# Patient Record
Sex: FEMALE | Race: WHITE | URBAN - METROPOLITAN AREA
[De-identification: names, ages, dates, MRNs, and addresses within clinical notes are randomized per-mention and may not be internally consistent; named-entity substitution may affect disease eponyms.]

---

## 2018-08-28 PROBLEM — I87.2 VENOUS INSUFFICIENCY: Status: ACTIVE | Noted: 2018-08-28

## 2018-08-28 NOTE — PROGRESS NOTES
Patient cancelled and given alternative vein provider names in area. I am currently not performing vein therapy as we are in the process of transitioning to a new device vendor.

## 2018-08-28 NOTE — PATIENT INSTRUCTIONS
Follow up with Sukhwinder Schuster at [facility] [phone]  [facility]  [address], [place] [place] [address] [place] [address]

## 2018-08-31 ENCOUNTER — OFFICE VISIT (OUTPATIENT)
Dept: CARDIOLOGY CLINIC | Age: 78
End: 2018-08-31

## 2018-08-31 VITALS
DIASTOLIC BLOOD PRESSURE: 74 MMHG | BODY MASS INDEX: 25.84 KG/M2 | SYSTOLIC BLOOD PRESSURE: 120 MMHG | HEIGHT: 60 IN | RESPIRATION RATE: 16 BRPM | WEIGHT: 131.6 LBS | OXYGEN SATURATION: 97 % | HEART RATE: 74 BPM

## 2018-08-31 DIAGNOSIS — I87.2 VENOUS INSUFFICIENCY: Primary | ICD-10-CM

## 2018-08-31 RX ORDER — FLUTICASONE PROPIONATE 50 MCG
SPRAY, SUSPENSION (ML) NASAL
COMMUNITY

## 2018-08-31 RX ORDER — CHLORAL HYDRATE 500 MG
CAPSULE ORAL
COMMUNITY

## 2018-08-31 RX ORDER — FEXOFENADINE HCL 180 MG/1
TABLET ORAL
COMMUNITY

## 2018-08-31 RX ORDER — LOSARTAN POTASSIUM 50 MG/1
TABLET ORAL
COMMUNITY
Start: 2018-08-13

## 2018-08-31 RX ORDER — LOSARTAN POTASSIUM 25 MG/1
TABLET ORAL
COMMUNITY

## 2018-08-31 RX ORDER — HYDROCHLOROTHIAZIDE 25 MG/1
TABLET ORAL
COMMUNITY
Start: 2018-06-27

## 2018-08-31 RX ORDER — CRANBERRY FRUIT EXTRACT 200 MG
CAPSULE ORAL
COMMUNITY
Start: 2017-02-13

## 2018-08-31 RX ORDER — AMLODIPINE BESYLATE 5 MG/1
TABLET ORAL
COMMUNITY

## 2023-07-13 ENCOUNTER — TELEPHONE (OUTPATIENT)
Dept: CARDIOLOGY CLINIC | Age: 83
End: 2023-07-13

## 2023-07-13 NOTE — TELEPHONE ENCOUNTER
New Patient Referral    Referring Provider Name:  [facility]   Phone Number: [phone]  Fax Number:  [phone]  Address: [address], 4th floor,[facility]    Diagnosis/Reason for Visit: r/o ischemic stroke - vasovagal syncope - r/o arrhythmia    Cardiac Clearance? no    Cardiac Testing: (Yes/No/Unsure)     Date testing was completed?___________      Have records been requested? (Yes/No)    Preferred Language: English    LM for pt to call and schedule. Please verify the old address in [facility] is her and change to correct address on [address].

## 2023-07-13 NOTE — TELEPHONE ENCOUNTER
Patient was referred by Dr. Adin Vargas  to the [facility] location with Dr. Ethan Mccrary.  Patient has been scheduled for 09/07 at 3:00pm (am/pm). Patient verbalized understanding of appointment instructions. Call complete.

## 2023-08-08 ENCOUNTER — NURSE ONLY (OUTPATIENT)
Dept: CARDIOLOGY CLINIC | Age: 83
End: 2023-08-08

## 2023-08-08 NOTE — PROGRESS NOTES
50 Hr E-Patch requested for pt. Device was registered and placed. Tutorial given, pt verbalized understanding.  Date 08/08/2023  Time 230pm S/N [id number]  xavier

## 2023-08-21 ENCOUNTER — HOSPITAL ENCOUNTER (OUTPATIENT)
Dept: MRI IMAGING | Age: 83
Discharge: HOME | End: 2023-08-21
Payer: MEDICARE

## 2023-08-21 ENCOUNTER — HOSPITAL ENCOUNTER (OUTPATIENT)
Dept: NEUROLOGY | Age: 83
Discharge: HOME | End: 2023-08-21
Payer: MEDICARE

## 2023-08-21 DIAGNOSIS — R55 VASOVAGAL SYNCOPE: ICD-10-CM

## 2023-08-21 DIAGNOSIS — I63.9 ISCHEMIC STROKE (HCC): ICD-10-CM

## 2023-08-21 DIAGNOSIS — E56.9 VITAMIN DEFICIENCY: ICD-10-CM

## 2023-08-21 DIAGNOSIS — I49.9 CARDIAC ARRHYTHMIA, UNSPECIFIED CARDIAC ARRHYTHMIA TYPE: ICD-10-CM

## 2023-08-21 PROCEDURE — 95816 EEG AWAKE AND DROWSY: CPT

## 2023-08-21 PROCEDURE — 95816 EEG AWAKE AND DROWSY: CPT | Performed by: PSYCHIATRY & NEUROLOGY

## 2023-08-21 PROCEDURE — 70551 MRI BRAIN STEM W/O DYE: CPT

## 2023-08-22 PROBLEM — I63.9 ISCHEMIC STROKE (HCC): Status: ACTIVE | Noted: 2023-08-22

## 2023-08-22 NOTE — PROCEDURES
[facility]                     [address], [address]                          ELECTROENCEPHALOGRAM REPORT    PATIENT NAME: Ayad Dutta                      :        1940  MED REC NO:   [id number]                          ROOM:  ACCOUNT NO:   [de-identified]                           ADMIT DATE: 2023  PROVIDER:     Lashaun Cash MD    DATE OF EE2023    REASON FOR STUDY:  Vasovagal syncope, ischemic stroke. SUMMARY:  The background rhythm on this recording consists of  moderately=developed and well-organized waveforms of 10-12 Hz frequency  which are bilaterally synchronous and symmetrical.  No focal  abnormalities were noted. No spikes or sharp waves were seen. Hyperventilation was not performed. Photic stimulation did not produce  any change. A sleep recording was not obtained. A recording during  drowsiness was normal.    IMPRESSION:  This EEG obtained during the awake state and drowsiness is  within normal limits.         Lashaun Cash MD    D: 2023 10:24:51       T: 2023 10:27:11     [email or web address]  Job#: [id number]     Doc#: [id number]    CC:

## 2023-09-07 ENCOUNTER — OFFICE VISIT (OUTPATIENT)
Dept: CARDIOLOGY CLINIC | Age: 83
End: 2023-09-07
Payer: MEDICARE

## 2023-09-07 VITALS
HEART RATE: 62 BPM | WEIGHT: 154.2 LBS | OXYGEN SATURATION: 99 % | DIASTOLIC BLOOD PRESSURE: 72 MMHG | BODY MASS INDEX: 30.27 KG/M2 | SYSTOLIC BLOOD PRESSURE: 128 MMHG | HEIGHT: 60 IN

## 2023-09-07 DIAGNOSIS — R55 SYNCOPE, UNSPECIFIED SYNCOPE TYPE: Primary | ICD-10-CM

## 2023-09-07 DIAGNOSIS — I47.1 SVT (SUPRAVENTRICULAR TACHYCARDIA) (HCC): ICD-10-CM

## 2023-09-07 PROBLEM — I47.10 SVT (SUPRAVENTRICULAR TACHYCARDIA): Status: ACTIVE | Noted: 2023-09-07

## 2023-09-07 PROCEDURE — 99204 OFFICE O/P NEW MOD 45 MIN: CPT | Performed by: INTERNAL MEDICINE

## 2023-09-07 PROCEDURE — 1123F ACP DISCUSS/DSCN MKR DOCD: CPT | Performed by: INTERNAL MEDICINE

## 2023-09-07 PROCEDURE — 93000 ELECTROCARDIOGRAM COMPLETE: CPT | Performed by: INTERNAL MEDICINE

## 2023-09-07 NOTE — PATIENT INSTRUCTIONS
No medication changes  Follow up in 6 months    If any symptoms of near passing out or passing out call office- may order a prolonged heart monitor

## 2023-09-07 NOTE — PROGRESS NOTES
tablet Take by mouth Yes Historical Provider, MD   fluticasone (FLONASE) 50 MCG/ACT nasal spray fluticasone 50 mcg/actuation nasal spray,suspension Yes Historical Provider, MD   hydrochlorothiazide (HYDRODIURIL) 25 MG tablet  Yes Historical Provider, MD   losartan (COZAAR) 50 MG tablet  Yes Historical Provider, MD   losartan (COZAAR) 25 MG tablet losartan 25 mg tablet Yes Historical Provider, MD   Red Yeast Rice Extract 600 MG CAPS One po bid Yes Historical Provider, MD   amLODIPine (NORVASC) 5 MG tablet amlodipine 5 mg tablet  Historical Provider, MD       [x] Medications and dosages reviewed. ROS:  [x]Full ROS obtained and negative except as mentioned in HPI      PHYSICAL EXAMINATION:  Vitals:    09/07/23 1449   BP: 128/72   Site: Left Upper Arm   Position: Sitting   Cuff Size: Medium Adult   Pulse: 62   SpO2: 99%   Weight: 154 lb 3.2 oz (69.9 kg)   Height: 5' (1.524 m)        GENERAL: Well developed, well nourished, No acute distress  NEUROLOGICAL: Alert and oriented  PSYCH: Calm affect  SKIN: Warm and dry, No visible rash,   EYES: Pupils equal and round, Sclera non-icteric,   HENT:  External ears and nose unremarkable, mucus membranes moist  MUSCULOSKELETAL: Normal cephalic, neck supple  CAROTID: Normal upstroke, no bruits  CARDIAC: JVP normal, Normal PMI, regular rate and rhythm, normal S1S2, no murmur, rub, or gallop  RESPIRATORY: Normal respiratory effort, clear to auscultation bilaterally  EXTREMITIES: No edema  GASTROINTESTINAL: normal bowel sounds, soft, non-tender, No hepatomegaly     3 Day Holter 8/2023  No afib, Sinus, Short PAT x 2, longest 6 beats    ECHO 4/2023  SUMMARY:     1. Left ventricle: The cavity size was normal. Wall thickness was      normal. Systolic function was normal. The estimated ejection      fraction was in the range of 55% to 60%. Wall motion was normal;      there were no regional wall motion abnormalities.  Doppler      parameters are consistent with abnormal left ventricular

## 2024-01-04 ENCOUNTER — TELEPHONE (OUTPATIENT)
Dept: CARDIOLOGY CLINIC | Age: 84
End: 2024-01-04

## 2024-01-04 ENCOUNTER — APPOINTMENT (OUTPATIENT)
Dept: GENERAL RADIOLOGY | Age: 84
End: 2024-01-04
Payer: MEDICARE

## 2024-01-04 ENCOUNTER — HOSPITAL ENCOUNTER (EMERGENCY)
Age: 84
Discharge: HOME | End: 2024-01-04
Attending: INTERNAL MEDICINE
Payer: MEDICARE

## 2024-01-04 ENCOUNTER — APPOINTMENT (OUTPATIENT)
Dept: CT IMAGING | Age: 84
End: 2024-01-04
Payer: MEDICARE

## 2024-01-04 VITALS
DIASTOLIC BLOOD PRESSURE: 80 MMHG | WEIGHT: 155 LBS | HEART RATE: 67 BPM | HEIGHT: 60 IN | RESPIRATION RATE: 18 BRPM | TEMPERATURE: 97.8 F | SYSTOLIC BLOOD PRESSURE: 182 MMHG | BODY MASS INDEX: 30.43 KG/M2

## 2024-01-04 DIAGNOSIS — R06.02 SHORTNESS OF BREATH: Primary | ICD-10-CM

## 2024-01-04 LAB
ANION GAP SERPL CALCULATED.3IONS-SCNC: 11 MMOL/L (ref 3–16)
BASOPHILS # BLD: 0 K/UL (ref 0–0.2)
BASOPHILS NFR BLD: 0.2 %
BUN SERPL-MCNC: 18 MG/DL (ref 7–20)
CALCIUM SERPL-MCNC: 10 MG/DL (ref 8.3–10.6)
CHLORIDE SERPL-SCNC: 94 MMOL/L (ref 99–110)
CO2 SERPL-SCNC: 29 MMOL/L (ref 21–32)
CREAT SERPL-MCNC: 0.8 MG/DL (ref 0.6–1.2)
D DIMER: 2.45 UG/ML FEU (ref 0–0.6)
DEPRECATED RDW RBC AUTO: 14.1 % (ref 12.4–15.4)
EKG ATRIAL RATE: 70 BPM
EKG DIAGNOSIS: NORMAL
EKG P AXIS: 55 DEGREES
EKG P-R INTERVAL: 194 MS
EKG Q-T INTERVAL: 392 MS
EKG QRS DURATION: 90 MS
EKG QTC CALCULATION (BAZETT): 423 MS
EKG R AXIS: -13 DEGREES
EKG T AXIS: 48 DEGREES
EKG VENTRICULAR RATE: 70 BPM
EOSINOPHIL # BLD: 0 K/UL (ref 0–0.6)
EOSINOPHIL NFR BLD: 0 %
FLUAV RNA RESP QL NAA+PROBE: NOT DETECTED
FLUBV RNA RESP QL NAA+PROBE: NOT DETECTED
GFR SERPLBLD CREATININE-BSD FMLA CKD-EPI: >60 ML/MIN/{1.73_M2}
GLUCOSE SERPL-MCNC: 96 MG/DL (ref 70–99)
HCT VFR BLD AUTO: 38.2 % (ref 36–48)
HGB BLD-MCNC: 13.3 G/DL (ref 12–16)
LYMPHOCYTES # BLD: 0.5 K/UL (ref 1–5.1)
LYMPHOCYTES NFR BLD: 6 %
MCH RBC QN AUTO: 33 PG (ref 26–34)
MCHC RBC AUTO-ENTMCNC: 34.7 G/DL (ref 31–36)
MCV RBC AUTO: 94.9 FL (ref 80–100)
MONOCYTES # BLD: 0.4 K/UL (ref 0–1.3)
MONOCYTES NFR BLD: 4.1 %
NEUTROPHILS # BLD: 8.2 K/UL (ref 1.7–7.7)
NEUTROPHILS NFR BLD: 89.7 %
NT-PROBNP SERPL-MCNC: 283 PG/ML (ref 0–449)
PLATELET # BLD AUTO: 219 K/UL (ref 135–450)
PMV BLD AUTO: 6.9 FL (ref 5–10.5)
POTASSIUM SERPL-SCNC: 3.5 MMOL/L (ref 3.5–5.1)
RBC # BLD AUTO: 4.03 M/UL (ref 4–5.2)
RSV AG NOSE QL: NEGATIVE
SARS-COV-2 RNA RESP QL NAA+PROBE: NOT DETECTED
SODIUM SERPL-SCNC: 134 MMOL/L (ref 136–145)
TROPONIN, HIGH SENSITIVITY: 13 NG/L (ref 0–14)
WBC # BLD AUTO: 9.2 K/UL (ref 4–11)

## 2024-01-04 PROCEDURE — 71045 X-RAY EXAM CHEST 1 VIEW: CPT

## 2024-01-04 PROCEDURE — 85025 COMPLETE CBC W/AUTO DIFF WBC: CPT

## 2024-01-04 PROCEDURE — 71260 CT THORAX DX C+: CPT

## 2024-01-04 PROCEDURE — 99285 EMERGENCY DEPT VISIT HI MDM: CPT

## 2024-01-04 PROCEDURE — 80048 BASIC METABOLIC PNL TOTAL CA: CPT

## 2024-01-04 PROCEDURE — 6360000004 HC RX CONTRAST MEDICATION: Performed by: INTERNAL MEDICINE

## 2024-01-04 PROCEDURE — 93005 ELECTROCARDIOGRAM TRACING: CPT | Performed by: INTERNAL MEDICINE

## 2024-01-04 PROCEDURE — 83880 ASSAY OF NATRIURETIC PEPTIDE: CPT

## 2024-01-04 PROCEDURE — 85379 FIBRIN DEGRADATION QUANT: CPT

## 2024-01-04 PROCEDURE — 87807 RSV ASSAY W/OPTIC: CPT

## 2024-01-04 PROCEDURE — 36415 COLL VENOUS BLD VENIPUNCTURE: CPT

## 2024-01-04 PROCEDURE — 87636 SARSCOV2 & INF A&B AMP PRB: CPT

## 2024-01-04 PROCEDURE — 93010 ELECTROCARDIOGRAM REPORT: CPT | Performed by: INTERNAL MEDICINE

## 2024-01-04 PROCEDURE — 84484 ASSAY OF TROPONIN QUANT: CPT

## 2024-01-04 RX ORDER — MULTIVIT WITH MINERALS/LUTEIN
250 TABLET ORAL DAILY
COMMUNITY

## 2024-01-04 RX ADMIN — IOPAMIDOL 75 ML: 755 INJECTION, SOLUTION INTRAVENOUS at 18:08

## 2024-01-04 ASSESSMENT — LIFESTYLE VARIABLES
HOW OFTEN DO YOU HAVE A DRINK CONTAINING ALCOHOL: NEVER
HOW MANY STANDARD DRINKS CONTAINING ALCOHOL DO YOU HAVE ON A TYPICAL DAY: PATIENT DOES NOT DRINK

## 2024-01-04 ASSESSMENT — PAIN - FUNCTIONAL ASSESSMENT: PAIN_FUNCTIONAL_ASSESSMENT: NONE - DENIES PAIN

## 2024-01-04 NOTE — TELEPHONE ENCOUNTER
Per [facility]- She needs a stress myoview but may not be able to get soon enough due to insurance. She should go to ER for evaluation.

## 2024-01-04 NOTE — ED NOTES
Patient ambulated from [facility] to Zone 3 bathroom, past room 9, and back to [facility].    SPO2 at rest: 100%  Pulse at rest: 76 bpm    SPO2 while ambulating 100%  Pulse while ambulatin bpm

## 2024-01-04 NOTE — ED PROVIDER NOTES
EMERGENCY MEDICINE PROVIDER NOTE    Patient Identification  Pt Name: Jany Morfin  MRN: [id number]  Birthdate 1940  Date of evaluation: 1/4/2024  Provider: KRISTI VERDE DO  PCP: Lionel Perdomo    Chief Complaint  Shortness of Breath (Patient states that her doctor sent her here for SOB worse with exertion sometimes at rest. She had some pulmonary tests done and they were fine. )      HPI  (History provided by patient)  This is a 83 y.o. female who was brought in by self for short of breath that has been going on for a while.  Bring to the patient she has been seen by her primary care physician but was sent into the ER due to persistent shortness of breath with exertion.  At times, she also has some shortness of breath with rest.  Patient denies cough.  Patient denies fever and chills.  Patient denies any peripheral edema.  She was tried on some steroids for shortness of breath but she tells me it did not help.  Patient states that she has had some pulmonary test done and they were found to be normal.    I have reviewed the following nursing documentation:  Allergies: Aspirin, Cetirizine, Gemfibrozil, Levofloxacin, Loratadine, and Lovastatin    Past medical history:   Past Medical History:   Diagnosis Date    Hypertension     Syncope and collapse      Past surgical history:   Past Surgical History:   Procedure Laterality Date    CHOLECYSTECTOMY, LAPAROSCOPIC      HYSTERECTOMY, TOTAL ABDOMINAL (CERVIX REMOVED)         Home medications:   Discharge Medication List as of 1/4/2024  8:02 PM        CONTINUE these medications which have NOT CHANGED    Details   Ascorbic Acid (VITAMIN C) 250 MG tablet Take 1 tablet by mouth dailyHistorical Med      amLODIPine (NORVASC) 5 MG tablet amlodipine 5 mg tabletHistorical Med      Cholecalciferol 2000 units TABS Take by mouthHistorical Med      Omega-3 Fatty Acids (FISH OIL) 1000 MG CAPS Fish Oil   dailyHistorical Med      fexofenadine (ALLEGRA) 180 MG tablet Take by

## 2024-01-04 NOTE — TELEPHONE ENCOUNTER
Spoke to patient. She is having SOB at rest that gets worse with activity that started about 3 weeks ago. Her SOB has gotten worse over the last week. When her SOB gets real bad she develops chest heaviness. Unable to detail chest heaviness. Both are alleviated after rest. She has not had any heart racing.     She sounds SOB just talking on the phone and with little activity. Does not feel lke she needs to go to ER.    She saw PCP. EKG done (can't view) Chest x ray normal, CTA chest normal. BMP 11/21 normal but done before symptoms. Has not had other labs besides D Dimer. She was started on Prednisone and given inhaler by PCP but had to stop inhaler  due to mouth sores.    She saw Dr Nichole (pulm) today and he told her it was not her lungs. He thinks she may have a heart blockage.

## 2024-01-04 NOTE — TELEPHONE ENCOUNTER
Pt states she has increasing SOB with out activity over the last 3 weeks. Can hear it in her voice while talking to her. Denies chest pain increased BP or swelling. Please call to advise

## 2024-01-08 ENCOUNTER — OFFICE VISIT (OUTPATIENT)
Dept: CARDIOLOGY CLINIC | Age: 84
End: 2024-01-08
Payer: MEDICARE

## 2024-01-08 VITALS
BODY MASS INDEX: 30.23 KG/M2 | HEIGHT: 60 IN | OXYGEN SATURATION: 99 % | DIASTOLIC BLOOD PRESSURE: 70 MMHG | WEIGHT: 154 LBS | HEART RATE: 68 BPM | SYSTOLIC BLOOD PRESSURE: 132 MMHG

## 2024-01-08 DIAGNOSIS — I47.10 SVT (SUPRAVENTRICULAR TACHYCARDIA): ICD-10-CM

## 2024-01-08 DIAGNOSIS — R07.89 OTHER CHEST PAIN: ICD-10-CM

## 2024-01-08 DIAGNOSIS — R06.02 SOB (SHORTNESS OF BREATH): Primary | ICD-10-CM

## 2024-01-08 DIAGNOSIS — I10 PRIMARY HYPERTENSION: ICD-10-CM

## 2024-01-08 PROCEDURE — G8417 CALC BMI ABV UP PARAM F/U: HCPCS | Performed by: NURSE PRACTITIONER

## 2024-01-08 PROCEDURE — 1090F PRES/ABSN URINE INCON ASSESS: CPT | Performed by: NURSE PRACTITIONER

## 2024-01-08 PROCEDURE — 3078F DIAST BP <80 MM HG: CPT | Performed by: NURSE PRACTITIONER

## 2024-01-08 PROCEDURE — 3075F SYST BP GE 130 - 139MM HG: CPT | Performed by: NURSE PRACTITIONER

## 2024-01-08 PROCEDURE — G8427 DOCREV CUR MEDS BY ELIG CLIN: HCPCS | Performed by: NURSE PRACTITIONER

## 2024-01-08 PROCEDURE — G8484 FLU IMMUNIZE NO ADMIN: HCPCS | Performed by: NURSE PRACTITIONER

## 2024-01-08 PROCEDURE — 1036F TOBACCO NON-USER: CPT | Performed by: NURSE PRACTITIONER

## 2024-01-08 PROCEDURE — 1123F ACP DISCUSS/DSCN MKR DOCD: CPT | Performed by: NURSE PRACTITIONER

## 2024-01-08 PROCEDURE — G8400 PT W/DXA NO RESULTS DOC: HCPCS | Performed by: NURSE PRACTITIONER

## 2024-01-08 PROCEDURE — 99214 OFFICE O/P EST MOD 30 MIN: CPT | Performed by: NURSE PRACTITIONER

## 2024-01-08 RX ORDER — ISOSORBIDE MONONITRATE 30 MG/1
15 TABLET, EXTENDED RELEASE ORAL DAILY
Qty: 30 TABLET | Refills: 3 | Status: SHIPPED | OUTPATIENT
Start: 2024-01-08

## 2024-01-08 NOTE — PROGRESS NOTES
[facility]     Outpatient Follow Up Note    Jany Morfin is 83 y.o. female who presents today with a history of syncope suspected dehydration vs vagal, SVT/PAT and HTN.      Interval hx: 1/4/24: ER:  83 y.o. female who was brought in by self for short of breath that has been going on for a while.  Bring to the patient she has been seen by her primary care physician but was sent into the ER due to persistent shortness of breath with exertion.  At times, she also has some shortness of breath with rest.  Patient denies cough.  Patient denies fever and chills.  Patient denies any peripheral edema.  She was tried on some steroids for shortness of breath but she tells me it did not help.  Patient states that she has had some pulmonary test done and they were found to be normal.     EKG did not show any acute findings.  Troponin was not found to be elevated.  D-dimer was elevated at 2.45 and a CT of the chest was done which did not show evidence of a PE.  Patient has not hypoxic or tachycardic.  Patient is not hemodynamically unstable.  I do not see any evidence of pneumonia or infection.  Patient states she follows with a pulmonologist and also Dr. Morris with cardiology     CHIEF COMPLAINT / HPI:  Follow Up secondary to SOB    Subjective:   She has a little chest heaviness with exertion. She has no associated radiation/nausea.    She's been SOB for over 3 weeks. She went downstairs one Saturday and her son noticed she was SOB. Four days ago, it'd really gotten worse. She saw her pulmonologist who ruled out being from her lungs  She's ok if she walks slow. It seems to be the worse in the morning. The patient denies orthopnea/PND. She's tired as well.    The patient has swelling from varicose veins. The patients weight is stable . The patient is not experiencing palpitations or dizziness.   Her BP is low - normal in the mornings and normal - high by evening.     These symptoms are worsening since the last OV.

## 2024-01-08 NOTE — PATIENT INSTRUCTIONS
CTA cardiac : looks at your heart's blood supply non-invasively    Begin isosorbide mononitrate : 30 mg tablets at 1/2 tablet daily     Appt in 1 week after CTA

## 2024-01-09 ENCOUNTER — HOSPITAL ENCOUNTER (OUTPATIENT)
Dept: CT IMAGING | Age: 84
Discharge: HOME | End: 2024-01-09
Payer: MEDICARE

## 2024-01-09 VITALS
SYSTOLIC BLOOD PRESSURE: 124 MMHG | DIASTOLIC BLOOD PRESSURE: 79 MMHG | RESPIRATION RATE: 16 BRPM | OXYGEN SATURATION: 100 % | HEART RATE: 63 BPM

## 2024-01-09 DIAGNOSIS — R07.89 OTHER CHEST PAIN: ICD-10-CM

## 2024-01-09 DIAGNOSIS — R06.02 SOB (SHORTNESS OF BREATH): ICD-10-CM

## 2024-01-09 PROCEDURE — 6360000004 HC RX CONTRAST MEDICATION: Performed by: NURSE PRACTITIONER

## 2024-01-09 PROCEDURE — 75574 CT ANGIO HRT W/3D IMAGE: CPT

## 2024-01-09 RX ORDER — LOSARTAN POTASSIUM 100 MG/1
TABLET ORAL
COMMUNITY
Start: 2023-12-01

## 2024-01-09 RX ORDER — BUDESONIDE, GLYCOPYRROLATE, AND FORMOTEROL FUMARATE 160; 9; 4.8 UG/1; UG/1; UG/1
2 AEROSOL, METERED RESPIRATORY (INHALATION) 2 TIMES DAILY
COMMUNITY
Start: 2023-12-29

## 2024-01-09 RX ORDER — ALBUTEROL SULFATE 90 UG/1
2 AEROSOL, METERED RESPIRATORY (INHALATION) EVERY 6 HOURS PRN
COMMUNITY
Start: 2023-12-29

## 2024-01-09 RX ADMIN — IOPAMIDOL 100 ML: 755 INJECTION, SOLUTION INTRAVENOUS at 08:54

## 2024-01-09 NOTE — DISCHARGE INSTRUCTIONS
Coronary Computed Tomography Angiography (CCTA)    Coronary Computed Tomography Angiography (CCTA) is a noninvasive test that uses X-rays in the form of computed tomography to diagnose coronary artery disease. Coronary CTA provides high-resolution images of the blood vessels supplying the heart, allowing identification of narrowing or blockages caused by plaque.      What Are the Benefits of Coronary CTA?    Provides high-definition 3-D images of the arteries feeding the heart and blockages at the earliest stages when they can be treated most effectively.  Most accurate noninvasive diagnostic test for coronary artery disease.  Measures both calcified and noncalcified plaques. Noncalcified plaques are more prone to rupture and cause heart attacks than calcified plaques. By assessing both types of plaque, coronary CTA analyzes your risk for a heart attack.  Monitor the effectiveness of therapy since noncalcified plaques may shrink with effective treatment.    This care sheet gives you a general idea about how long it will take for you to recover. But each person recovers at a different pace. Follow the steps below to feel better as quickly as possible.  How can you care for yourself at home?  Activity    If you experience a headache it is from the Nitroglycerin. Have a drink with caffeine to alleviate the symptoms.  Should symptoms continue contact your physician.        Drink plenty of water     You can resume normal activity     If your doctor recommends it, get more exercise. Walking is a good choice. Bit by bit, increase the amount you walk every day. Try for at least 30 minutes on most days of the week.   Diet    Drink plenty of fluids to help your body flush out the dye. If you have kidney, heart, or liver disease and have to limit fluids, talk with your doctor before you increase the amount of fluids you drink.     Keep eating a heart-healthy diet that has lots of fruits, vegetables, and whole grains. If you

## 2024-01-15 ENCOUNTER — TELEPHONE (OUTPATIENT)
Dept: CARDIOLOGY CLINIC | Age: 84
End: 2024-01-15

## 2024-01-15 NOTE — TELEPHONE ENCOUNTER
----- Message from LORRI Kinney CNP sent at 1/12/2024  3:30 PM EST -----  Calcium score 4     Pul nodules noted >> will need surveillance scans in one year and follow up with PCP

## 2024-01-16 ENCOUNTER — OFFICE VISIT (OUTPATIENT)
Dept: CARDIOLOGY CLINIC | Age: 84
End: 2024-01-16
Payer: MEDICARE

## 2024-01-16 VITALS
OXYGEN SATURATION: 99 % | BODY MASS INDEX: 30.63 KG/M2 | HEIGHT: 60 IN | WEIGHT: 156 LBS | SYSTOLIC BLOOD PRESSURE: 118 MMHG | DIASTOLIC BLOOD PRESSURE: 60 MMHG | HEART RATE: 73 BPM

## 2024-01-16 DIAGNOSIS — I47.10 SVT (SUPRAVENTRICULAR TACHYCARDIA): ICD-10-CM

## 2024-01-16 DIAGNOSIS — I10 PRIMARY HYPERTENSION: ICD-10-CM

## 2024-01-16 DIAGNOSIS — R07.89 OTHER CHEST PAIN: ICD-10-CM

## 2024-01-16 DIAGNOSIS — R06.02 SOB (SHORTNESS OF BREATH): Primary | ICD-10-CM

## 2024-01-16 PROCEDURE — G8427 DOCREV CUR MEDS BY ELIG CLIN: HCPCS | Performed by: NURSE PRACTITIONER

## 2024-01-16 PROCEDURE — 1123F ACP DISCUSS/DSCN MKR DOCD: CPT | Performed by: NURSE PRACTITIONER

## 2024-01-16 PROCEDURE — 3074F SYST BP LT 130 MM HG: CPT | Performed by: NURSE PRACTITIONER

## 2024-01-16 PROCEDURE — 1090F PRES/ABSN URINE INCON ASSESS: CPT | Performed by: NURSE PRACTITIONER

## 2024-01-16 PROCEDURE — G8484 FLU IMMUNIZE NO ADMIN: HCPCS | Performed by: NURSE PRACTITIONER

## 2024-01-16 PROCEDURE — G8400 PT W/DXA NO RESULTS DOC: HCPCS | Performed by: NURSE PRACTITIONER

## 2024-01-16 PROCEDURE — 99214 OFFICE O/P EST MOD 30 MIN: CPT | Performed by: NURSE PRACTITIONER

## 2024-01-16 PROCEDURE — G8417 CALC BMI ABV UP PARAM F/U: HCPCS | Performed by: NURSE PRACTITIONER

## 2024-01-16 PROCEDURE — 1036F TOBACCO NON-USER: CPT | Performed by: NURSE PRACTITIONER

## 2024-01-16 PROCEDURE — 3078F DIAST BP <80 MM HG: CPT | Performed by: NURSE PRACTITIONER

## 2024-01-16 NOTE — PATIENT INSTRUCTIONS
Echocardiogram : reassess your heart's squeeze and valves    Stress test: assess BP and oxygen saturation change    Follow up with pulmonologist : paralyzed hemidiaphragm Rt    Appt in 2-3 weeks

## 2024-01-16 NOTE — PROGRESS NOTES
[facility]     Outpatient Follow Up Note    Jany Morfin is 83 y.o. female who presents today with a history of syncope suspected dehydration vs vagal, SVT/PAT and HTN.      Interval hx: 1/4/24: ER:  83 y.o. female who was brought in by self for short of breath that has been going on for a while.  Bring to the patient she has been seen by her primary care physician but was sent into the ER due to persistent shortness of breath with exertion.  At times, she also has some shortness of breath with rest.  Patient denies cough.  Patient denies fever and chills.  Patient denies any peripheral edema.  She was tried on some steroids for shortness of breath but she tells me it did not help.  Patient states that she has had some pulmonary test done and they were found to be normal.     EKG did not show any acute findings.  Troponin was not found to be elevated.  D-dimer was elevated at 2.45 and a CT of the chest was done which did not show evidence of a PE.  Patient has not hypoxic or tachycardic.  Patient is not hemodynamically unstable.  I do not see any evidence of pneumonia or infection.  Patient states she follows with a pulmonologist and also Dr. Morris with cardiology     CHIEF COMPLAINT / HPI:  Follow Up secondary to SOB. Her CTA cardiac returned with a score of 4    Subjective:   Imdur helped some. She's had days that are a little better than others. She remains SOB going on 4 weeks    She breaths ok if she walks slowly. It seems to be the worse in the morning. The patient denies orthopnea/PND.     She has a little chest heaviness with exertion and at times discomfort beneath her breast. She has no associated radiation/nausea.  The patient has swelling from varicose veins. The patients weight is stable . The patient is not experiencing palpitations or dizziness.   Her BP is low - normal in the mornings and normal - high by evening.     These symptoms are essentially unchanged since the last OV.   With

## 2024-01-19 ENCOUNTER — HOSPITAL ENCOUNTER (OUTPATIENT)
Dept: CARDIOLOGY | Age: 84
Discharge: HOME | End: 2024-01-19
Payer: MEDICARE

## 2024-01-19 DIAGNOSIS — R06.02 SOB (SHORTNESS OF BREATH): ICD-10-CM

## 2024-01-19 DIAGNOSIS — R07.89 OTHER CHEST PAIN: ICD-10-CM

## 2024-01-19 PROCEDURE — 93306 TTE W/DOPPLER COMPLETE: CPT

## 2024-01-22 ENCOUNTER — TELEPHONE (OUTPATIENT)
Dept: CARDIOLOGY CLINIC | Age: 84
End: 2024-01-22

## 2024-01-29 ENCOUNTER — TELEPHONE (OUTPATIENT)
Dept: CARDIOLOGY CLINIC | Age: 84
End: 2024-01-29

## 2024-01-29 ENCOUNTER — PROCEDURE VISIT (OUTPATIENT)
Dept: CARDIOLOGY CLINIC | Age: 84
End: 2024-01-29
Payer: MEDICARE

## 2024-01-29 DIAGNOSIS — R06.02 SOB (SHORTNESS OF BREATH): ICD-10-CM

## 2024-01-29 PROCEDURE — 93015 CV STRESS TEST SUPVJ I&R: CPT | Performed by: INTERNAL MEDICINE

## 2024-01-29 NOTE — TELEPHONE ENCOUNTER
CARDIAC STRESS TEST EXERCISE ONLY ----- Message from LORRI Kinney CNP sent at 1/29/2024  1:52 PM EST -----  ok

## 2024-01-30 ENCOUNTER — OFFICE VISIT (OUTPATIENT)
Dept: CARDIOLOGY CLINIC | Age: 84
End: 2024-01-30
Payer: MEDICARE

## 2024-01-30 VITALS
WEIGHT: 155 LBS | HEIGHT: 60 IN | BODY MASS INDEX: 30.43 KG/M2 | SYSTOLIC BLOOD PRESSURE: 150 MMHG | DIASTOLIC BLOOD PRESSURE: 60 MMHG | HEART RATE: 75 BPM | OXYGEN SATURATION: 99 %

## 2024-01-30 DIAGNOSIS — I10 PRIMARY HYPERTENSION: ICD-10-CM

## 2024-01-30 DIAGNOSIS — I47.10 SVT (SUPRAVENTRICULAR TACHYCARDIA): ICD-10-CM

## 2024-01-30 DIAGNOSIS — R07.89 OTHER CHEST PAIN: ICD-10-CM

## 2024-01-30 DIAGNOSIS — R06.02 SOB (SHORTNESS OF BREATH): Primary | ICD-10-CM

## 2024-01-30 PROCEDURE — G8400 PT W/DXA NO RESULTS DOC: HCPCS | Performed by: NURSE PRACTITIONER

## 2024-01-30 PROCEDURE — G8484 FLU IMMUNIZE NO ADMIN: HCPCS | Performed by: NURSE PRACTITIONER

## 2024-01-30 PROCEDURE — 3078F DIAST BP <80 MM HG: CPT | Performed by: NURSE PRACTITIONER

## 2024-01-30 PROCEDURE — G8417 CALC BMI ABV UP PARAM F/U: HCPCS | Performed by: NURSE PRACTITIONER

## 2024-01-30 PROCEDURE — 1123F ACP DISCUSS/DSCN MKR DOCD: CPT | Performed by: NURSE PRACTITIONER

## 2024-01-30 PROCEDURE — 3077F SYST BP >= 140 MM HG: CPT | Performed by: NURSE PRACTITIONER

## 2024-01-30 PROCEDURE — 99214 OFFICE O/P EST MOD 30 MIN: CPT | Performed by: NURSE PRACTITIONER

## 2024-01-30 PROCEDURE — G8428 CUR MEDS NOT DOCUMENT: HCPCS | Performed by: NURSE PRACTITIONER

## 2024-01-30 PROCEDURE — 1036F TOBACCO NON-USER: CPT | Performed by: NURSE PRACTITIONER

## 2024-01-30 PROCEDURE — 1090F PRES/ABSN URINE INCON ASSESS: CPT | Performed by: NURSE PRACTITIONER

## 2024-01-30 RX ORDER — VALSARTAN 160 MG/1
160 TABLET ORAL DAILY
Qty: 90 TABLET | Refills: 1 | Status: SHIPPED | OUTPATIENT
Start: 2024-01-30

## 2024-01-30 RX ORDER — ISOSORBIDE MONONITRATE 30 MG/1
30 TABLET, EXTENDED RELEASE ORAL DAILY
Qty: 30 TABLET | Refills: 5 | Status: SHIPPED | OUTPATIENT
Start: 2024-01-30

## 2024-01-30 NOTE — PROGRESS NOTES
[facility]     Outpatient Follow Up Note    Jany Morfin is 83 y.o. female who presents today with a history of syncope suspected dehydration vs vagal, SVT/PAT and HTN.      hx: 1/4/24: ER:  83 y.o. female who was brought in by self for short of breath that has been going on for a while.  Bring to the patient she has been seen by her primary care physician but was sent into the ER due to persistent shortness of breath with exertion.  At times, she also has some shortness of breath with rest.  Patient denies cough.  Patient denies fever and chills.  Patient denies any peripheral edema.  She was tried on some steroids for shortness of breath but she tells me it did not help.  Patient states that she has had some pulmonary test done and they were found to be normal.     EKG did not show any acute findings.  Troponin was not found to be elevated.  D-dimer was elevated at 2.45 and a CT of the chest was done which did not show evidence of a PE.  Patient has not hypoxic or tachycardic.  Patient is not hemodynamically unstable.  I do not see any evidence of pneumonia or infection.  Patient states she follows with a pulmonologist and also Dr. Morris with cardiology     Interval hx:   Echo: unremarkable, EF 60%  GXT:  ~stress induced shortness of breath with resting O2 at 99% RA and  peak O2 97% RA.   ~Stress Peak BP: 165/71 mmHg       CHIEF COMPLAINT / HPI:  Follow Up secondary to SOB.     Subjective:   Isosorbide has helped immensely. 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 She was ironing this morning then after 5-6 garments she started to feel winded.  She still has some bad days but compared to where she was, its great. She went to [employer] after [employer], walked ~ 1/2 block to the restaurant then was a little winded.    She denies orthopnea/PND. Her breathing doesn't bother her at nighttime    She breaths ok if she walks slowly.   She has some chest discomfort that seems to change directions, ie in

## 2024-01-30 NOTE — PATIENT INSTRUCTIONS
Increase isosorbide to 1 tablet = 30 mg daily    Stop losartan and begin valsartan 160 mg daily    Keep appt with Dr. Morris

## 2024-02-05 ENCOUNTER — TELEPHONE (OUTPATIENT)
Dept: CARDIOLOGY CLINIC | Age: 84
End: 2024-02-05

## 2024-02-05 NOTE — TELEPHONE ENCOUNTER
The patient phoned stating that [facility] is saying that her   isosorbide mononitrate (IMDUR) 30 MG extended release tablet prescription cannot be refilled unless [facility]PT[facility] calls the Insurance because the dosage has been changed.  [employer] [phone].  Thank you

## 2024-02-06 RX ORDER — ISOSORBIDE MONONITRATE 30 MG/1
30 TABLET, EXTENDED RELEASE ORAL DAILY
Qty: 30 TABLET | Refills: 5 | Status: SHIPPED | OUTPATIENT
Start: 2024-02-06

## 2024-02-06 NOTE — TELEPHONE ENCOUNTER
I called the pharmacy ; she is authorized to  30 mg of isosorbide mononitrate daily; to make sure, I sent a new prescription over to [facility] pharmacy    Will you let her know please.

## 2024-03-07 ENCOUNTER — OFFICE VISIT (OUTPATIENT)
Dept: CARDIOLOGY CLINIC | Age: 84
End: 2024-03-07
Payer: MEDICARE

## 2024-03-07 VITALS
OXYGEN SATURATION: 99 % | WEIGHT: 151 LBS | SYSTOLIC BLOOD PRESSURE: 134 MMHG | BODY MASS INDEX: 29.64 KG/M2 | DIASTOLIC BLOOD PRESSURE: 70 MMHG | HEART RATE: 66 BPM | HEIGHT: 60 IN

## 2024-03-07 DIAGNOSIS — I47.10 SVT (SUPRAVENTRICULAR TACHYCARDIA): ICD-10-CM

## 2024-03-07 DIAGNOSIS — E78.2 MIXED HYPERLIPIDEMIA: ICD-10-CM

## 2024-03-07 DIAGNOSIS — R55 SYNCOPE, UNSPECIFIED SYNCOPE TYPE: ICD-10-CM

## 2024-03-07 DIAGNOSIS — R06.02 SOB (SHORTNESS OF BREATH): Primary | ICD-10-CM

## 2024-03-07 DIAGNOSIS — I10 PRIMARY HYPERTENSION: ICD-10-CM

## 2024-03-07 PROCEDURE — G8427 DOCREV CUR MEDS BY ELIG CLIN: HCPCS | Performed by: INTERNAL MEDICINE

## 2024-03-07 PROCEDURE — 99214 OFFICE O/P EST MOD 30 MIN: CPT | Performed by: INTERNAL MEDICINE

## 2024-03-07 PROCEDURE — 1123F ACP DISCUSS/DSCN MKR DOCD: CPT | Performed by: INTERNAL MEDICINE

## 2024-03-07 PROCEDURE — G8417 CALC BMI ABV UP PARAM F/U: HCPCS | Performed by: INTERNAL MEDICINE

## 2024-03-07 PROCEDURE — G8484 FLU IMMUNIZE NO ADMIN: HCPCS | Performed by: INTERNAL MEDICINE

## 2024-03-07 PROCEDURE — G8400 PT W/DXA NO RESULTS DOC: HCPCS | Performed by: INTERNAL MEDICINE

## 2024-03-07 PROCEDURE — 3078F DIAST BP <80 MM HG: CPT | Performed by: INTERNAL MEDICINE

## 2024-03-07 PROCEDURE — 1090F PRES/ABSN URINE INCON ASSESS: CPT | Performed by: INTERNAL MEDICINE

## 2024-03-07 PROCEDURE — 1036F TOBACCO NON-USER: CPT | Performed by: INTERNAL MEDICINE

## 2024-03-07 PROCEDURE — 3075F SYST BP GE 130 - 139MM HG: CPT | Performed by: INTERNAL MEDICINE

## 2024-03-07 RX ORDER — VALSARTAN 160 MG/1
160 TABLET ORAL DAILY
Qty: 90 TABLET | Refills: 4 | Status: SHIPPED | OUTPATIENT
Start: 2024-03-07

## 2024-03-07 RX ORDER — ROSUVASTATIN CALCIUM 5 MG/1
5 TABLET, COATED ORAL DAILY
Qty: 90 TABLET | Refills: 4 | Status: SHIPPED | OUTPATIENT
Start: 2024-03-07

## 2024-03-07 RX ORDER — ISOSORBIDE MONONITRATE 30 MG/1
30 TABLET, EXTENDED RELEASE ORAL DAILY
Qty: 90 TABLET | Refills: 4 | Status: SHIPPED | OUTPATIENT
Start: 2024-03-07

## 2024-03-07 NOTE — PROGRESS NOTES
syncope or presyncope will place a longer monitor.     HTN:  Well controlled.  Continue diovan and HCTZ    CAD;  Mild  Risk factor modification    Hyperlipidemia:  With mild LM disease I would like her on a statin  She has been intolerant in the past  Try crestor 5 mg  Check lipids 3 months    Shortness of Breath:  Etiology unclear  Resolved  follow    PLAN:  Crestor 5  Lipids 3 months  F/u 6 months    Thank you for allowing me to participate in the care of this individual.      Christofer Morris M.D., FACC

## 2024-03-07 NOTE — PATIENT INSTRUCTIONS
Start Crestor 5 mg daily after you have trialed coming off of Imdur  Fasting labs in 3 months to recheck cholesterol  Follow up in 6 months    Send us a my chart message on how you are tolerating coming off of Imdur and how you end up tolerating Crestor

## 2025-03-18 DIAGNOSIS — R06.02 SOB (SHORTNESS OF BREATH): ICD-10-CM

## 2025-03-18 NOTE — TELEPHONE ENCOUNTER
Received refill request for isosorbide mononitrate (IMDUR) 30 MG  from  [facility]  pharmacy.     Last OV: 3/7/24    Next OV:     Last Labs:     Last Filled: 3/7/24

## 2025-03-18 NOTE — TELEPHONE ENCOUNTER
Patient is overdue for fu with [facility]. Had appt in Sept and cancelled it. Left message on patient's VM asking her to call back to discuss if she would like to schedule fu appt for med refill. She will also need updated labs.

## 2025-03-20 RX ORDER — ISOSORBIDE MONONITRATE 30 MG/1
30 TABLET, EXTENDED RELEASE ORAL DAILY
Qty: 30 TABLET | Refills: 0 | Status: SHIPPED | OUTPATIENT
Start: 2025-03-20

## 2025-03-20 NOTE — TELEPHONE ENCOUNTER
Left another message for patient asking her to call back to discuss. Refilled Imdur for 30 days for now with no refills.